# Patient Record
Sex: FEMALE
[De-identification: names, ages, dates, MRNs, and addresses within clinical notes are randomized per-mention and may not be internally consistent; named-entity substitution may affect disease eponyms.]

---

## 2017-08-29 PROBLEM — Z00.00 ENCOUNTER FOR PREVENTIVE HEALTH EXAMINATION: Status: ACTIVE | Noted: 2017-08-29

## 2020-09-02 ENCOUNTER — APPOINTMENT (OUTPATIENT)
Dept: HEART AND VASCULAR | Facility: CLINIC | Age: 85
End: 2020-09-02
Payer: MEDICARE

## 2020-09-02 VITALS — DIASTOLIC BLOOD PRESSURE: 80 MMHG | HEART RATE: 74 BPM | SYSTOLIC BLOOD PRESSURE: 140 MMHG

## 2020-09-02 PROCEDURE — 99214 OFFICE O/P EST MOD 30 MIN: CPT

## 2020-09-02 NOTE — REASON FOR VISIT
[FreeTextEntry1] : 85-year-old female with past medical history of hypertension here for first evaluation\par The patient denies any chest pain, shortness of breath, palpitations, syncope, presyncope.\par She can walk 2 blocks with assistance, limited by vertigo.\par She denies exertional angina.\par She reports being very anxious in the setting of the COVID-19 pandemic\par today Is accompanied by her daughter who reports normal blood pressure at home. \par \par \par PMH \par HTN\par \par ALL\par PENICILIN\par \par MEDS\par Losartan/hydrochlorothiazide 50/12.5\par Omeprazole 20 mg\par Meclizine 12.5\par \par FH \par no family h/o CAD\par \par SH\par Nonsmoker, no alcohol, no drugs\par \par Echocardiogram 11/27/2019\par Normal left ventricular systolic function\par Grade 1 diastolic dysfunction\par Trace MR\par Mild MR\par \par \par EKG 9/02/2020\par Sinus rhythm, within normal limits

## 2020-09-02 NOTE — PHYSICAL EXAM
[General Appearance - Well Developed] : well developed [Normal Appearance] : normal appearance [Well Groomed] : well groomed [General Appearance - Well Nourished] : well nourished [No Deformities] : no deformities [General Appearance - In No Acute Distress] : no acute distress [Normal Oral Mucosa] : normal oral mucosa [No Oral Pallor] : no oral pallor [No Oral Cyanosis] : no oral cyanosis [Normal Jugular Venous A Waves Present] : normal jugular venous A waves present [Normal Jugular Venous V Waves Present] : normal jugular venous V waves present [No Jugular Venous Lopez A Waves] : no jugular venous lopez A waves [Heart Rate And Rhythm] : heart rate and rhythm were normal [Heart Sounds] : normal S1 and S2 [Murmurs] : no murmurs present [Respiration, Rhythm And Depth] : normal respiratory rhythm and effort [Exaggerated Use Of Accessory Muscles For Inspiration] : no accessory muscle use [Auscultation Breath Sounds / Voice Sounds] : lungs were clear to auscultation bilaterally [Abdomen Soft] : soft [Abdomen Tenderness] : non-tender [Nail Clubbing] : no clubbing of the fingernails [Abdomen Mass (___ Cm)] : no abdominal mass palpated [Petechial Hemorrhages (___cm)] : no petechial hemorrhages [Cyanosis, Localized] : no localized cyanosis [] : no ischemic changes

## 2020-09-02 NOTE — ASSESSMENT
[FreeTextEntry1] : 85-year-old female with past medical history of hypertension here for first evaluation\par \par ROUTINE CARDIOLOGY EVALUATION\par The patient denies anginal symptoms, and she has fair exercise tolerance\par -EKG is within normal limits, echocardiogram less than a year ago was within normal limits\par -Recommend to continue with daily exercise and healthy diet\par -We'll consider additional testing if onset of symptoms\par \par HTN\par -Borderline today,patient reports good blood pressure control at home\par -Continue with current medication\par -CMP as nadeem GAUTHIER\par \par HLD\par -Fasting lipid panel\par \par Lab in 4 months or sooner if any symptoms

## 2020-09-02 NOTE — PHYSICAL EXAM
[General Appearance - Well Developed] : well developed [Normal Appearance] : normal appearance [General Appearance - Well Nourished] : well nourished [Well Groomed] : well groomed [No Deformities] : no deformities [General Appearance - In No Acute Distress] : no acute distress [Normal Oral Mucosa] : normal oral mucosa [No Oral Pallor] : no oral pallor [No Oral Cyanosis] : no oral cyanosis [Normal Jugular Venous A Waves Present] : normal jugular venous A waves present [Normal Jugular Venous V Waves Present] : normal jugular venous V waves present [No Jugular Venous Lopez A Waves] : no jugular venous lopez A waves [Heart Rate And Rhythm] : heart rate and rhythm were normal [Heart Sounds] : normal S1 and S2 [Murmurs] : no murmurs present [Respiration, Rhythm And Depth] : normal respiratory rhythm and effort [Auscultation Breath Sounds / Voice Sounds] : lungs were clear to auscultation bilaterally [Exaggerated Use Of Accessory Muscles For Inspiration] : no accessory muscle use [Abdomen Soft] : soft [Abdomen Tenderness] : non-tender [Abdomen Mass (___ Cm)] : no abdominal mass palpated [Nail Clubbing] : no clubbing of the fingernails [Petechial Hemorrhages (___cm)] : no petechial hemorrhages [Cyanosis, Localized] : no localized cyanosis [] : no ischemic changes